# Patient Record
Sex: FEMALE | Race: BLACK OR AFRICAN AMERICAN | NOT HISPANIC OR LATINO | URBAN - METROPOLITAN AREA
[De-identification: names, ages, dates, MRNs, and addresses within clinical notes are randomized per-mention and may not be internally consistent; named-entity substitution may affect disease eponyms.]

---

## 2017-04-02 ENCOUNTER — EMERGENCY (EMERGENCY)
Facility: HOSPITAL | Age: 52
LOS: 1 days | Discharge: HOME | End: 2017-04-02
Attending: EMERGENCY MEDICINE | Admitting: EMERGENCY MEDICINE
Payer: OTHER MISCELLANEOUS

## 2017-04-02 VITALS
HEART RATE: 75 BPM | SYSTOLIC BLOOD PRESSURE: 153 MMHG | DIASTOLIC BLOOD PRESSURE: 75 MMHG | TEMPERATURE: 98 F | RESPIRATION RATE: 16 BRPM | OXYGEN SATURATION: 100 %

## 2017-04-02 PROCEDURE — 99283 EMERGENCY DEPT VISIT LOW MDM: CPT

## 2017-04-02 RX ORDER — ACETAMINOPHEN 500 MG
975 TABLET ORAL ONCE
Qty: 0 | Refills: 0 | Status: COMPLETED | OUTPATIENT
Start: 2017-04-02 | End: 2017-04-02

## 2017-04-02 RX ADMIN — Medication 975 MILLIGRAM(S): at 09:31

## 2017-04-02 NOTE — ED PROVIDER NOTE - PLAN OF CARE
Follow up with your doctor in 2-3 days.  Return for worse pain, vomiting, nausea, confusion, or other emergent concerns.

## 2017-04-02 NOTE — ED PROVIDER NOTE - DETAILS:
The scribe's documentation has been prepared under my direction and personally reviewed by me in its entirety. I confirm that the note above accurately reflects all work, treatment, procedures, and medical decision making performed by me (Dr. Brito).

## 2017-04-02 NOTE — ED PROVIDER NOTE - NS ED MD SCRIBE ATTENDING SCRIBE SECTIONS
HIV/REVIEW OF SYSTEMS/VITAL SIGNS( Pullset)/DISPOSITION/PHYSICAL EXAM/HISTORY OF PRESENT ILLNESS/PAST MEDICAL/SURGICAL/SOCIAL HISTORY
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
## 2017-04-02 NOTE — ED PROVIDER NOTE - CARE PLAN
Principal Discharge DX:	Closed head injury, initial encounter  Instructions for follow-up, activity and diet:	Follow up with your doctor in 2-3 days.  Return for worse pain, vomiting, nausea, confusion, or other emergent concerns.
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
## 2017-04-02 NOTE — ED PROVIDER NOTE - MEDICAL DECISION MAKING DETAILS
50 y/o female with HA s/p head trauma 2 days ago. No neurological deficits; no anti-coagulant use. Will treat symptomatically.

## 2017-09-12 ENCOUNTER — EMERGENCY (EMERGENCY)
Facility: HOSPITAL | Age: 52
LOS: 1 days | Discharge: LEFT BEFORE DISCHARGE | End: 2017-09-12
Attending: EMERGENCY MEDICINE | Admitting: EMERGENCY MEDICINE
Payer: MEDICARE

## 2017-09-12 VITALS
DIASTOLIC BLOOD PRESSURE: 73 MMHG | TEMPERATURE: 98 F | SYSTOLIC BLOOD PRESSURE: 162 MMHG | RESPIRATION RATE: 18 BRPM | OXYGEN SATURATION: 100 % | HEART RATE: 84 BPM | WEIGHT: 139.99 LBS

## 2017-09-12 DIAGNOSIS — E78.5 HYPERLIPIDEMIA, UNSPECIFIED: ICD-10-CM

## 2017-09-12 DIAGNOSIS — R25.1 TREMOR, UNSPECIFIED: ICD-10-CM
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
## 2017-09-12 DIAGNOSIS — I10 ESSENTIAL (PRIMARY) HYPERTENSION: ICD-10-CM

## 2017-09-12 DIAGNOSIS — R53.1 WEAKNESS: ICD-10-CM
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
## 2017-09-12 DIAGNOSIS — E03.9 HYPOTHYROIDISM, UNSPECIFIED: ICD-10-CM

## 2017-09-12 PROCEDURE — 99284 EMERGENCY DEPT VISIT MOD MDM: CPT

## 2017-09-12 PROCEDURE — 99283 EMERGENCY DEPT VISIT LOW MDM: CPT

## 2017-09-12 PROCEDURE — 93005 ELECTROCARDIOGRAM TRACING: CPT

## 2017-09-12 PROCEDURE — 36416 COLLJ CAPILLARY BLOOD SPEC: CPT

## 2017-09-12 NOTE — ED PROVIDER NOTE - PHYSICAL EXAMINATION
Afebrile, hemodynamically stable  NAD, well appearing  EOMI grossly  MMM  Breathing comfortably on RA  AAOx3, CN's 3-12 grossly intact  BELLA spontaneously

## 2017-09-12 NOTE — ED ADULT TRIAGE NOTE - CHIEF COMPLAINT QUOTE
[place], s/p fall, denies any head or neck pain, ambulates steadily, h/o DM, states she felt better after drinking some of her OJ, present [id number]
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
## 2017-09-12 NOTE — ED PROVIDER NOTE - MEDICAL DECISION MAKING DETAILS
50 y/o F pt presents to the ED with fall and generalized weakness which she states has resolved. Pt states she does not want further evaluation or treatment. Pt is a&ox3 and has adequate decision making capacity. Pt verbalizes reasons for wanting to leave and states she wants to leave as her symptoms have resolved, despite my offering to give her her nighttime medications here at the ED. Pt was d/c against medical advice. 50 y/o F pt presents to the ED with fall and generalized weakness which she states has resolved. Pt states she does not want further evaluation or treatment. Pt is AAOx3 and displays adequate medical decision making capacity. Pt verbalizes reasons for wanting to leave and states she wants to leave as her symptoms have resolved, despite my offering to give her her nighttime medications here at the ED. Pt was discharged against medical advice, offered to return as soon as she would like for further eval and treatment.

## 2017-09-12 NOTE — ED ADULT NURSE NOTE - CHIEF COMPLAINT QUOTE
[place], s/p fall, denies any head or neck pain, ambulates steadily, h/o DM, states she felt better after drinking some of her OJ, present [id number]

## 2017-09-15 NOTE — ED POST DISCHARGE NOTE - ADDITIONAL DOCUMENTATION
pt signed out ama.  spoke to her today.  states feeling "better"  feels "fine".  she will follow up with her primary doctor

## 2017-10-04 ENCOUNTER — EMERGENCY (EMERGENCY)
Facility: HOSPITAL | Age: 52
LOS: 1 days | Discharge: HOME | End: 2017-10-04
Attending: EMERGENCY MEDICINE | Admitting: EMERGENCY MEDICINE
Payer: MEDICARE

## 2017-10-04 VITALS
DIASTOLIC BLOOD PRESSURE: 68 MMHG | OXYGEN SATURATION: 100 % | TEMPERATURE: 98 F | SYSTOLIC BLOOD PRESSURE: 161 MMHG | HEART RATE: 87 BPM | RESPIRATION RATE: 16 BRPM

## 2017-10-04 PROCEDURE — 99284 EMERGENCY DEPT VISIT MOD MDM: CPT

## 2017-10-04 PROCEDURE — 70450 CT HEAD/BRAIN W/O DYE: CPT | Mod: 26

## 2017-10-04 RX ORDER — ACETAMINOPHEN 500 MG
650 TABLET ORAL ONCE
Qty: 0 | Refills: 0 | Status: COMPLETED | OUTPATIENT
Start: 2017-10-04 | End: 2017-10-04

## 2017-10-04 RX ADMIN — Medication 650 MILLIGRAM(S): at 11:12

## 2017-10-04 NOTE — ED ADULT TRIAGE NOTE - CHIEF COMPLAINT QUOTE
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 Pt denies anticoagulants, states that for a brief time after the fall was unable to remember where she was or name of agency she worked for, now back to baseline, c/o pain to head, denies N/V, denies dizziness.

## 2017-10-04 NOTE — ED PROVIDER NOTE - OBJECTIVE STATEMENT
52 y/o female with PMHx of Bipolar, HTN, Hypothyroidism, and HLD presents to ED for head injury today s/p fall. 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 Pt denies any LOC but notes having a brief episode of unable to remember the agency or phone number as well as having difficulty getting up. Pt states that has since resolved. Pt denies any neck pain, back pain, fever, chills, SOB, chest pain, or abd pain.

## 2017-10-04 NOTE — ED PROVIDER NOTE - MUSCULOSKELETAL, MLM
Spine appears normal, range of motion is not limited, no muscle or joint tenderness. No midline tenderness. 5/5 b/l UE and LE.

## 2017-10-04 NOTE — ED PROVIDER NOTE - MEDICAL DECISION MAKING DETAILS
50 y/o male with head injury s/p fall with a brief episode of difficulty remembering but no LOC. CT, Analgesia.

## 2017-10-04 NOTE — ED PROVIDER NOTE - PROGRESS NOTE DETAILS
Pt states feeling better. Denies any current pain. Ambulating without any difficulty. Discussed rest and plenty of fluids and may take Tylenol 650mg every 4 hours as needed for pain.

## 2018-07-20 ENCOUNTER — EMERGENCY (EMERGENCY)
Facility: HOSPITAL | Age: 53
LOS: 1 days | Discharge: HOME | End: 2018-07-20
Attending: EMERGENCY MEDICINE | Admitting: EMERGENCY MEDICINE
Payer: MEDICARE

## 2018-07-20 VITALS
DIASTOLIC BLOOD PRESSURE: 65 MMHG | RESPIRATION RATE: 16 BRPM | HEART RATE: 88 BPM | TEMPERATURE: 98 F | SYSTOLIC BLOOD PRESSURE: 136 MMHG | OXYGEN SATURATION: 100 %

## 2018-07-20 LAB
ALBUMIN SERPL ELPH-MCNC: 4.7 G/DL — SIGNIFICANT CHANGE UP (ref 3.3–5)
ALP SERPL-CCNC: 49 U/L — SIGNIFICANT CHANGE UP (ref 40–120)
ALT FLD-CCNC: 18 U/L — SIGNIFICANT CHANGE UP (ref 4–33)
AST SERPL-CCNC: 45 U/L — HIGH (ref 4–32)
B-OH-BUTYR SERPL-SCNC: 0.3 MMOL/L — SIGNIFICANT CHANGE UP (ref 0–0.4)
BASOPHILS # BLD AUTO: 0.02 K/UL — SIGNIFICANT CHANGE UP (ref 0–0.2)
BASOPHILS NFR BLD AUTO: 0.3 % — SIGNIFICANT CHANGE UP (ref 0–2)
BILIRUB SERPL-MCNC: < 0.2 MG/DL — LOW (ref 0.2–1.2)
BUN SERPL-MCNC: 13 MG/DL — SIGNIFICANT CHANGE UP (ref 7–23)
BUN SERPL-MCNC: 13 MG/DL — SIGNIFICANT CHANGE UP (ref 7–23)
CALCIUM SERPL-MCNC: 10.1 MG/DL — SIGNIFICANT CHANGE UP (ref 8.4–10.5)
CALCIUM SERPL-MCNC: 9.9 MG/DL — SIGNIFICANT CHANGE UP (ref 8.4–10.5)
CHLORIDE SERPL-SCNC: 96 MMOL/L — LOW (ref 98–107)
CHLORIDE SERPL-SCNC: 96 MMOL/L — LOW (ref 98–107)
CO2 SERPL-SCNC: 22 MMOL/L — SIGNIFICANT CHANGE UP (ref 22–31)
CO2 SERPL-SCNC: 25 MMOL/L — SIGNIFICANT CHANGE UP (ref 22–31)
CREAT SERPL-MCNC: 0.63 MG/DL — SIGNIFICANT CHANGE UP (ref 0.5–1.3)
CREAT SERPL-MCNC: 0.67 MG/DL — SIGNIFICANT CHANGE UP (ref 0.5–1.3)
EOSINOPHIL # BLD AUTO: 0.1 K/UL — SIGNIFICANT CHANGE UP (ref 0–0.5)
EOSINOPHIL NFR BLD AUTO: 1.3 % — SIGNIFICANT CHANGE UP (ref 0–6)
GLUCOSE SERPL-MCNC: 215 MG/DL — HIGH (ref 70–99)
GLUCOSE SERPL-MCNC: 288 MG/DL — HIGH (ref 70–99)
HCT VFR BLD CALC: 38 % — SIGNIFICANT CHANGE UP (ref 34.5–45)
HGB BLD-MCNC: 12.6 G/DL — SIGNIFICANT CHANGE UP (ref 11.5–15.5)
IMM GRANULOCYTES # BLD AUTO: 0.04 # — SIGNIFICANT CHANGE UP
IMM GRANULOCYTES NFR BLD AUTO: 0.5 % — SIGNIFICANT CHANGE UP (ref 0–1.5)
LYMPHOCYTES # BLD AUTO: 2.35 K/UL — SIGNIFICANT CHANGE UP (ref 1–3.3)
LYMPHOCYTES # BLD AUTO: 29.8 % — SIGNIFICANT CHANGE UP (ref 13–44)
MCHC RBC-ENTMCNC: 30.9 PG — SIGNIFICANT CHANGE UP (ref 27–34)
MCHC RBC-ENTMCNC: 33.2 % — SIGNIFICANT CHANGE UP (ref 32–36)
MCV RBC AUTO: 93.1 FL — SIGNIFICANT CHANGE UP (ref 80–100)
MONOCYTES # BLD AUTO: 0.45 K/UL — SIGNIFICANT CHANGE UP (ref 0–0.9)
MONOCYTES NFR BLD AUTO: 5.7 % — SIGNIFICANT CHANGE UP (ref 2–14)
NEUTROPHILS # BLD AUTO: 4.92 K/UL — SIGNIFICANT CHANGE UP (ref 1.8–7.4)
NEUTROPHILS NFR BLD AUTO: 62.4 % — SIGNIFICANT CHANGE UP (ref 43–77)
NRBC # FLD: 0 — SIGNIFICANT CHANGE UP
PH BLDV: SIGNIFICANT CHANGE UP PH (ref 7.32–7.43)
PLATELET # BLD AUTO: 323 K/UL — SIGNIFICANT CHANGE UP (ref 150–400)
PMV BLD: 9.5 FL — SIGNIFICANT CHANGE UP (ref 7–13)
POTASSIUM SERPL-MCNC: 4.2 MMOL/L — SIGNIFICANT CHANGE UP (ref 3.5–5.3)
POTASSIUM SERPL-MCNC: SIGNIFICANT CHANGE UP MMOL/L (ref 3.5–5.3)
POTASSIUM SERPL-SCNC: 4.2 MMOL/L — SIGNIFICANT CHANGE UP (ref 3.5–5.3)
POTASSIUM SERPL-SCNC: SIGNIFICANT CHANGE UP MMOL/L (ref 3.5–5.3)
PROT SERPL-MCNC: 8.2 G/DL — SIGNIFICANT CHANGE UP (ref 6–8.3)
RBC # BLD: 4.08 M/UL — SIGNIFICANT CHANGE UP (ref 3.8–5.2)
RBC # FLD: 12.6 % — SIGNIFICANT CHANGE UP (ref 10.3–14.5)
SODIUM SERPL-SCNC: 135 MMOL/L — SIGNIFICANT CHANGE UP (ref 135–145)
SODIUM SERPL-SCNC: 138 MMOL/L — SIGNIFICANT CHANGE UP (ref 135–145)
WBC # BLD: 7.88 K/UL — SIGNIFICANT CHANGE UP (ref 3.8–10.5)
WBC # FLD AUTO: 7.88 K/UL — SIGNIFICANT CHANGE UP (ref 3.8–10.5)

## 2018-07-20 PROCEDURE — 99284 EMERGENCY DEPT VISIT MOD MDM: CPT

## 2018-07-20 NOTE — ED ADULT TRIAGE NOTE - CHIEF COMPLAINT QUOTE
High blood sugar reading. States 345 at home. Started on Januvia yesterday. Denies weakness, dizziness, SOB. [id number] in triage.  States compliant with all meds. Denies SI/HI. Hx Bipolar, HTN

## 2018-07-20 NOTE — ED ADULT NURSE NOTE - OBJECTIVE STATEMENT
received pt to intake room 6 for evaluation of high glucose readings x 3 days, recently started on januvia yesterday. pt reports compliance with taking both januvia and metformin, reporting that glucose has been in the 300's. pt c/o generalized weakness, denies increased thirst, increased urination or any additional symptoms. pt presents awake a&ox4, denies dizziness or ha. skin warm, dry, appropriate for race. respirations even, unlabored. denies cp or sob. abdomen soft nontender nondistended. denies n/v/d. denies fevers or chills. bloods drawn and sent to lab. pt refusing ivl placement at this time, requesting to wait until bloodwork results. safety maintained, pending disposition.

## 2018-07-20 NOTE — ED PROVIDER NOTE - OBJECTIVE STATEMENT
53yo F hx of HTN, DM on metformin and januvia, Bipolar do pw "I want my sugar checked IV' States she went to see pmd two days ago, noted to have a AIC 8.9 started pt on Januvia in addition to Metformin not on insulin. States she "feels fine" no change in vision. no polyuria no polydipsia no chest pain no sob no abdominal pain. no nausea/vomit no dysuria/freq or urgency  Not on AC

## 2018-07-20 NOTE — ED PROVIDER NOTE - PHYSICAL EXAMINATION
pt sitting up on the phone, no distress. ambulatory in ED. looks comfortable. EOMI. mmm. normal S1-S2 No resp distress. able to speak in full and clear sentences. no wheeze, rales or stridor. soft nontender abdomen. no  rebound. no guarding. no sign of trauma. no CVAT no pedal edema. no calf tenderness. normal pulses bilateral feet. AOx3, no focal deficits. CN 2-12 grossly intact. nl gait. no meningeal signs.

## 2018-07-20 NOTE — ED PROVIDER NOTE - PROGRESS NOTE DETAILS
pt no distress informed of results. given copies of labs. advised to see pmd this week. pt comfortable w plan

## 2018-08-22 ENCOUNTER — OUTPATIENT (OUTPATIENT)
Dept: OUTPATIENT SERVICES | Facility: HOSPITAL | Age: 53
LOS: 1 days | Discharge: STILL ADMITTED OR ADMINISTRATIVE | End: 2018-08-22

## 2018-08-23 DIAGNOSIS — I10 ESSENTIAL (PRIMARY) HYPERTENSION: ICD-10-CM

## 2018-08-23 DIAGNOSIS — E03.9 HYPOTHYROIDISM, UNSPECIFIED: ICD-10-CM

## 2018-08-23 DIAGNOSIS — F31.9 BIPOLAR DISORDER, UNSPECIFIED: ICD-10-CM

## 2018-08-23 DIAGNOSIS — E78.5 HYPERLIPIDEMIA, UNSPECIFIED: ICD-10-CM

## 2020-11-29 ENCOUNTER — EMERGENCY (EMERGENCY)
Facility: HOSPITAL | Age: 55
LOS: 1 days | Discharge: HOME | End: 2020-11-29
Attending: EMERGENCY MEDICINE | Admitting: EMERGENCY MEDICINE
Payer: MEDICARE

## 2020-11-29 VITALS
TEMPERATURE: 98 F | HEART RATE: 106 BPM | DIASTOLIC BLOOD PRESSURE: 97 MMHG | OXYGEN SATURATION: 100 % | RESPIRATION RATE: 15 BRPM | SYSTOLIC BLOOD PRESSURE: 199 MMHG

## 2020-11-29 VITALS
DIASTOLIC BLOOD PRESSURE: 71 MMHG | HEART RATE: 96 BPM | OXYGEN SATURATION: 100 % | SYSTOLIC BLOOD PRESSURE: 155 MMHG | RESPIRATION RATE: 17 BRPM

## 2020-11-29 PROCEDURE — 99283 EMERGENCY DEPT VISIT LOW MDM: CPT | Mod: GC

## 2020-11-29 NOTE — ED PROVIDER NOTE - ATTENDING CONTRIBUTION TO CARE
chasity: pt comes to the ED for concern of isolcated htn reading at home.  at opresentation bp high, but without any intervention bp lowered to 150's/ pt well, denies complaints, feels well, cor rrr pos s1s2, no murmur, lungs clear to a, non tender abd no r/g.  pt reassured and can follow up with her physician today    I performed a history and physical exam of the patient and discussed their management with the resident and /or advanced care provider. I reviewed the resident and /or ACP's note and agree with the documented findings and plan of care. My medical decison making and observations are found above.

## 2020-11-29 NOTE — ED PROVIDER NOTE - PHYSICAL EXAMINATION
GENERAL: well appearing in no acute distress, non-toxic appearing  HEAD: normocephalic, atraumatic  HENT: airway intact; oral mucosa moist; neck supple  EYES: normal conjunctiva; PERRLA, EOMI  CARDIAC: regular rate and rhythm, normal S1S2, no appreciable murmurs, 2+ pulses in UE/LE b/l  PULM: normal breath sounds, clear to ascultation bilaterally, no rales, rhonchi, wheezing  GI: abdomen nondistended, soft, nontender, no guarding, rebound tenderness  : no CVA tenderness b/l, no suprapubic tenderness  NEURO: no focal motor or sensory deficits, CN2-12 intact, normal speech, normal gait, AAOx3  MSK: no peripheral edema, no calf tenderness b/l  SKIN: well-perfused, extremities warm, no visible rashes  PSYCH: appropriate mood and affect

## 2020-11-29 NOTE — ED ADULT NURSE NOTE - OBJECTIVE STATEMENT
Patient is a 54y female, A&Ox4, ambulatory, complaining of high blood pressure at home. Patient has no physical complaints, requesting water and food. Respirations even and unlabored. Stretcher in lowest position, wheels locked, side rails up as per protocol. Call bell in reach. Will continue to monitor.

## 2020-11-29 NOTE — ED ADULT TRIAGE NOTE - CHIEF COMPLAINT QUOTE
alert oriented c/o high blood pressure and palpitations started 1700   no CP or SOB   hx HTN bipolar hyperthyroid

## 2020-11-29 NOTE — ED PROVIDER NOTE - PATIENT PORTAL LINK FT
You can access the FollowMyHealth Patient Portal offered by [facility] by registering at the following website: [email or web address]. By joining [employer]’s FollowMyHealth portal, you will also be able to view your health information using other applications (apps) compatible with our system.

## 2020-11-29 NOTE — ED PROVIDER NOTE - NS ED ROS FT
General: denies fever, chills  HENT: denies nasal congestion, rhinorrhea  Eyes: denies visual changes, blurred vision  CV: denies chest pain, palpitations  Resp: denies difficulty breathing, cough  Abdominal: denies nausea, vomiting, diarrhea, abdominal pain  : denies urinary pain or discharge  MSK: denies muscle aches, leg swelling  Neuro: denies headaches, numbness, tingling  Skin: denies rashes, bruises

## 2020-11-29 NOTE — ED PROVIDER NOTE - OBJECTIVE STATEMENT
53 yo F hx of HTN, DM on metformin and Bipolar coming in for elevated BP. Patient states she checked her BP earlier this evening and it was elevated to the 180's systolic, prompting patient to call EMS because she was worried. She denies any headache, changes in vision, chest pain, SOB, abdominal pain, weakness, numbness or tingliness in the extremities. She last saw her PMD last week and regularly takes her medication.

## 2020-11-29 NOTE — ED PROVIDER NOTE - PMH
Bipolar disorder    Bipolar disorder    HLD (hyperlipidemia)    HTN (hypertension)    Hypertension    Hypothyroidism    Hypothyroidism    Type 2 diabetes mellitus

## 2020-11-29 NOTE — ED PROVIDER NOTE - CLINICAL SUMMARY MEDICAL DECISION MAKING FREE TEXT BOX
53yo F w/ HTN, DM and bipolar coming in for elevated BP. Physical exam unremarkable. No concern for CVA given unremarkable physical exam 55yo F w/ HTN, DM and bipolar coming in for elevated BP. Physical exam unremarkable. No concern for CVA given unremarkable neurologic exam; no cardiac etiology at this time given history. Patient's BP at triage was 199/97 and decreased to 155/71 with no medical intervention. Patient is safe to be discharged with outpatient care. 55yo F w/ HTN, DM and bipolar coming in for elevated BP. Physical exam unremarkable. No concern for CVA given unremarkable neurologic exam; no cardiac etiology at this time given history. Patient's BP at triage was 199/97 and decreased to 155/71 with no medical intervention. Patient is safe to be discharged with outpatient care.    haydeey: pt comes to the ED for concern of isolcated htn reading at home.  at opresentation bp high, but without any intervention bp lowered to 150's/ pt well, denies complaints, feels well, cor rrr pos s1s2, no murmur, lungs clear to a, non tender abd no r/g.  pt reassured and can follow up with her physician today

## 2020-11-30 PROBLEM — E78.5 HYPERLIPIDEMIA, UNSPECIFIED: Chronic | Status: ACTIVE | Noted: 2017-09-13

## 2020-11-30 PROBLEM — F31.9 BIPOLAR DISORDER, UNSPECIFIED: Chronic | Status: ACTIVE | Noted: 2017-09-13

## 2020-11-30 PROBLEM — E03.9 HYPOTHYROIDISM, UNSPECIFIED: Chronic | Status: ACTIVE | Noted: 2017-09-13

## 2020-11-30 PROBLEM — I10 ESSENTIAL (PRIMARY) HYPERTENSION: Chronic | Status: ACTIVE | Noted: 2017-09-13

## 2022-02-06 ENCOUNTER — EMERGENCY (EMERGENCY)
Facility: HOSPITAL | Age: 57
LOS: 1 days | Discharge: HOME | End: 2022-02-06
Attending: EMERGENCY MEDICINE | Admitting: EMERGENCY MEDICINE
Payer: MEDICARE

## 2022-02-06 VITALS
SYSTOLIC BLOOD PRESSURE: 143 MMHG | HEART RATE: 89 BPM | OXYGEN SATURATION: 99 % | DIASTOLIC BLOOD PRESSURE: 75 MMHG | RESPIRATION RATE: 15 BRPM

## 2022-02-06 VITALS
HEART RATE: 98 BPM | TEMPERATURE: 98 F | DIASTOLIC BLOOD PRESSURE: 78 MMHG | RESPIRATION RATE: 16 BRPM | OXYGEN SATURATION: 100 % | SYSTOLIC BLOOD PRESSURE: 140 MMHG

## 2022-02-06 LAB
APPEARANCE UR: CLEAR — SIGNIFICANT CHANGE UP
BACTERIA # UR AUTO: NEGATIVE — SIGNIFICANT CHANGE UP
BASOPHILS # BLD AUTO: 0.01 K/UL — SIGNIFICANT CHANGE UP (ref 0–0.2)
BASOPHILS NFR BLD AUTO: 0.1 % — SIGNIFICANT CHANGE UP (ref 0–2)
BILIRUB UR-MCNC: NEGATIVE — SIGNIFICANT CHANGE UP
COLOR SPEC: SIGNIFICANT CHANGE UP
DIFF PNL FLD: ABNORMAL
EOSINOPHIL # BLD AUTO: 0.07 K/UL — SIGNIFICANT CHANGE UP (ref 0–0.5)
EOSINOPHIL NFR BLD AUTO: 1 % — SIGNIFICANT CHANGE UP (ref 0–6)
EPI CELLS # UR: 2 /HPF — SIGNIFICANT CHANGE UP (ref 0–5)
GLUCOSE UR QL: ABNORMAL
HCT VFR BLD CALC: 35.9 % — SIGNIFICANT CHANGE UP (ref 34.5–45)
HGB BLD-MCNC: 11.6 G/DL — SIGNIFICANT CHANGE UP (ref 11.5–15.5)
HYALINE CASTS # UR AUTO: 1 /LPF — SIGNIFICANT CHANGE UP (ref 0–7)
IANC: 4.79 K/UL — SIGNIFICANT CHANGE UP (ref 1.5–8.5)
IMM GRANULOCYTES NFR BLD AUTO: 0.1 % — SIGNIFICANT CHANGE UP (ref 0–1.5)
KETONES UR-MCNC: ABNORMAL
LEUKOCYTE ESTERASE UR-ACNC: NEGATIVE — SIGNIFICANT CHANGE UP
LYMPHOCYTES # BLD AUTO: 1.49 K/UL — SIGNIFICANT CHANGE UP (ref 1–3.3)
LYMPHOCYTES # BLD AUTO: 21.8 % — SIGNIFICANT CHANGE UP (ref 13–44)
MCHC RBC-ENTMCNC: 30.1 PG — SIGNIFICANT CHANGE UP (ref 27–34)
MCHC RBC-ENTMCNC: 32.3 GM/DL — SIGNIFICANT CHANGE UP (ref 32–36)
MCV RBC AUTO: 93.2 FL — SIGNIFICANT CHANGE UP (ref 80–100)
MONOCYTES # BLD AUTO: 0.45 K/UL — SIGNIFICANT CHANGE UP (ref 0–0.9)
MONOCYTES NFR BLD AUTO: 6.6 % — SIGNIFICANT CHANGE UP (ref 2–14)
NEUTROPHILS # BLD AUTO: 4.79 K/UL — SIGNIFICANT CHANGE UP (ref 1.8–7.4)
NEUTROPHILS NFR BLD AUTO: 70.4 % — SIGNIFICANT CHANGE UP (ref 43–77)
NITRITE UR-MCNC: NEGATIVE — SIGNIFICANT CHANGE UP
NRBC # BLD: 0 /100 WBCS — SIGNIFICANT CHANGE UP
NRBC # FLD: 0 K/UL — SIGNIFICANT CHANGE UP
PH UR: 6 — SIGNIFICANT CHANGE UP (ref 5–8)
PLATELET # BLD AUTO: 318 K/UL — SIGNIFICANT CHANGE UP (ref 150–400)
PROT UR-MCNC: ABNORMAL
RBC # BLD: 3.85 M/UL — SIGNIFICANT CHANGE UP (ref 3.8–5.2)
RBC # FLD: 13.6 % — SIGNIFICANT CHANGE UP (ref 10.3–14.5)
RBC CASTS # UR COMP ASSIST: 4 /HPF — SIGNIFICANT CHANGE UP (ref 0–4)
SP GR SPEC: 1.02 — SIGNIFICANT CHANGE UP (ref 1–1.05)
UROBILINOGEN FLD QL: SIGNIFICANT CHANGE UP
WBC # BLD: 6.82 K/UL — SIGNIFICANT CHANGE UP (ref 3.8–10.5)
WBC # FLD AUTO: 6.82 K/UL — SIGNIFICANT CHANGE UP (ref 3.8–10.5)
WBC UR QL: 1 /HPF — SIGNIFICANT CHANGE UP (ref 0–5)

## 2022-02-06 PROCEDURE — 99285 EMERGENCY DEPT VISIT HI MDM: CPT

## 2022-02-06 PROCEDURE — 76830 TRANSVAGINAL US NON-OB: CPT | Mod: 26

## 2022-02-06 NOTE — ED PROVIDER NOTE - NSICDXFAMILYHX_GEN_ALL_CORE_FT
FAMILY HISTORY:  Sibling  Still living? Unknown  Family history of schizophrenia, Age at diagnosis: Age Unknown

## 2022-02-06 NOTE — ED PROVIDER NOTE - PATIENT PORTAL LINK FT
You can access the FollowMyHealth Patient Portal offered by [facility] by registering at the following website: [email or web address]. By joining "[employer]’s FollowMyHealth portal, you will also be able to view your health information using other applications (apps) compatible with our system.

## 2022-02-06 NOTE — ED PROVIDER NOTE - CLINICAL SUMMARY MEDICAL DECISION MAKING FREE TEXT BOX
56F PMH HTN, DM2, HLD, hypothyroid complaining of 3 days of vaginal bleeding, (bright red, 2 pads per day), associated with intermittent lower abdominal cramping for past 2 weeks. Given hx and physical exam, concern for endometrial cancer vs fibroids vs UTI. Will obtain CBC, UA, TVUS and likely dc with OBGYN f/u.

## 2022-02-06 NOTE — ED PROVIDER NOTE - OBJECTIVE STATEMENT
56F PMH HTN, DM2, HLD, hypothyroid complaining of 3 days of vaginal bleeding, (bright red, 2 pads per day), associated with intermittent lower abdominal cramping for past 2 weeks. Denies dysuria, hematuria, fever, chills, n/v, dizziness, lightheadedness, chest pain, SOB, hematemesis, hematochezia. LMP 47 years old. No vaginal bleeding or spotting since start of menopause. Denies weight loss.

## 2022-02-06 NOTE — ED PROVIDER NOTE - PROGRESS NOTE DETAILS
Edwige Sparks DO (PGY1): Pelvic exam chaperoned by ED tech. Bright red blood visualized pooling in vaginal canal. Cervix not visualized. No CMT, no adnexal TTP. Edwige Sparks DO (PGY1): Pt made aware of lab and imaging results. Questions regarding their symptoms were addressed. Advised to follow up with primary care doctor and OBGYN. Given strict return precautions. Pt verbalized understanding.

## 2022-02-06 NOTE — ED PROVIDER NOTE - NSFOLLOWUPCLINICS_GEN_ALL_ED_FT
[facility]  OB/GYN & Surg  [address] [place] [id number]  Phone: [phone]  Fax:     [facility] Gynecology and Obstetrics  Gynceology/OB  [address] [place] [address]  Phone: [phone]  Fax:     [facility]  OB-GYN  [address] [place] [address]  Phone: [phone]  Fax:

## 2022-02-06 NOTE — ED PROVIDER NOTE - ATTENDING CONTRIBUTION TO CARE
56 year old with vaginal bleeding, post menopausal. concern for fibroids vs endometrial process. labs for anemia, tvus, will require prompt gyn for uterine ca workup

## 2022-02-06 NOTE — ED PROVIDER NOTE - PHYSICAL EXAMINATION
GENERAL: Awake. Alert. NAD. Well nourished.  HEENT: NC/AT, No conjunctival or mucosal pallor. Airway patent. Moist mucous membranes.  LUNGS: CTAB. No wheezes or rales noted.  CARDIAC: RRR. S1 and S2 intact. No murmurs noted.  ABDOMEN:  No masses noted. Soft, ND, no rebound, no guarding. Suprapubic TTP  BACK: No midline spinal tenderness, no CVA tenderness  EXT: No edema, no calf tenderness, distal pulses 2+ bilaterally  NEURO: A&Ox3. Moving all extremities. Sensation and strength intact throughout.   SKIN: Warm and dry.  PSYCH: Normal affect.

## 2022-02-06 NOTE — ED ADULT NURSE NOTE - OBJECTIVE STATEMENT
56F PMH HTN, DM2, HLD, hypothyroid complaining of 3 days of vaginal bleeding, (bright red, 2 pads per day), associated with intermittent lower abdominal cramping for past 2 weeks. Denies dysuria, hematuria, fever, chills, n/v, dizziness, lightheadedness, chest pain, SOB, hematemesis, hematochezia. LMP 47 years old. No vaginal bleeding or spotting since start of menopause. Md evaluated, VS as noted, labs sent. PT taken to US ,Family at bedside.

## 2022-02-06 NOTE — ED PROVIDER NOTE - NSICDXPASTMEDICALHX_GEN_ALL_CORE_FT
PAST MEDICAL HISTORY:  Bipolar disorder     Bipolar disorder     HLD (hyperlipidemia)     HTN (hypertension)     Hypertension     Hypothyroidism     Hypothyroidism     Type 2 diabetes mellitus

## 2022-02-06 NOTE — ED ADULT TRIAGE NOTE - CHIEF COMPLAINT QUOTE
states" I am bleeding since Friday and has lower abdominal pain since 2 weeks" states I stopped my periods when I was 47 .

## 2022-02-06 NOTE — ED PROVIDER NOTE - NSFOLLOWUPINSTRUCTIONS_ED_ALL_ED_FT
You were seen in the emergency department for vaginal bleeding.   Your imaging results showed 7mm slightly heterogenous endometrium with concern for possible endometrial cancer.   Please follow up with OBGYN for further workup and endometrial biopsy.   For pain you can take ibuprofen or acetaminophen according to the bottle instructions.   See attached information on vaginal bleeding.   If you need a primary care doctor you can call: [phone]     Please return to ED for any severe bleeding more than 2 pads per hour every hour, difficulty breathing, fainting, chest pain, severe vomiting, severe pain, high fever.

## 2022-02-08 LAB
CULTURE RESULTS: SIGNIFICANT CHANGE UP
SPECIMEN SOURCE: SIGNIFICANT CHANGE UP

## 2023-10-05 NOTE — ED PROVIDER NOTE - MUSCULOSKELETAL NECK EXAM
DISPLAY PLAN FREE TEXT
no c-spine tenderness/supple

## 2023-12-02 NOTE — ED PROVIDER NOTE - NSTIMEPROVIDERCAREINITIATE_GEN_ER
December 2, 2023     Patient: Mayra Bright   YOB: 2007   Date of Visit: 12/2/2023       To Whom it May Concern:    Mayra Bright was seen in my clinic on 12/2/2023 at 8:30 am.     Please excuse Mayra for her absence from school 11/27-12/1 due to illness.    Sincerely,         Rosalia Mckeon MD    Medical information is confidential and cannot be disclosed without the written consent of the patient or her representative.       29-Nov-2020 22:43
